# Patient Record
Sex: MALE | Race: WHITE
[De-identification: names, ages, dates, MRNs, and addresses within clinical notes are randomized per-mention and may not be internally consistent; named-entity substitution may affect disease eponyms.]

---

## 2020-05-26 ENCOUNTER — HOSPITAL ENCOUNTER (EMERGENCY)
Dept: HOSPITAL 75 - ER | Age: 40
Discharge: HOME | End: 2020-05-26
Payer: SELF-PAY

## 2020-05-26 VITALS — HEIGHT: 70.87 IN | BODY MASS INDEX: 25 KG/M2 | WEIGHT: 178.57 LBS

## 2020-05-26 VITALS — DIASTOLIC BLOOD PRESSURE: 88 MMHG | SYSTOLIC BLOOD PRESSURE: 122 MMHG

## 2020-05-26 VITALS — SYSTOLIC BLOOD PRESSURE: 128 MMHG | DIASTOLIC BLOOD PRESSURE: 93 MMHG

## 2020-05-26 DIAGNOSIS — F17.210: ICD-10-CM
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
## 2020-05-26 DIAGNOSIS — S01.81XA: ICD-10-CM

## 2020-05-26 DIAGNOSIS — S02.40DA: Primary | ICD-10-CM

## 2020-05-26 PROCEDURE — 70486 CT MAXILLOFACIAL W/O DYE: CPT

## 2020-05-26 PROCEDURE — 96372 THER/PROPH/DIAG INJ SC/IM: CPT

## 2020-05-26 NOTE — XMS REPORT
[facility]

                             Created on: 2018



Petar Kauffman

External Reference #: [id number]

: 1980

Sex: Male



Demographics





                          Address                   [address]  [address]

 

                          Preferred Language        Unknown

 

                          Marital Status            Unknown

 

                          Roman Catholic Affiliation     Unknown

 

                          Race                      Unknown

 

                          Ethnic Group              Unknown





Author





                          Author                    Petar ZIMMERMAN

 

                          Organization              [facility]

 

                          Address                   [address] [place]  [address]



 

                          Phone                     [phone]







Care Team Providers





                    Care Team Member Name Role                Phone

 

                    ANAID ZIMMERMAN      Unavailable         [phone]







PROBLEMS





          Type      Condition ICD9-CM Code CWF60-AR Code Onset Dates Condition S

tatus SNOMED 

Code

 

          Problem   Other chronic pain           G89.29              Active    8

1290491

 

          Problem   Hallux valgus (acquired), left foot           M20.12        

      Active    [id number]







ALLERGIES

No Known Allergies



ENCOUNTERS





                Encounter       Location        Date            Diagnosis

 

                          [facility] IN [facility]  [address] [facility] [id number]

[address] [place] [address] 

[address]                15 Sep, 2018              Bronchitis J40 ; Other chron

ic pain G89.29 and Pain in 

left knee M25.562

 

                          [facility]     [address] [facility] [id number]

[address] [place] [address]

                          16 Aug, 2018              Hallux valgus (acquired), le

ft foot M20.12







IMMUNIZATIONS

No Known Immunizations



SOCIAL HISTORY

Never Assessed



REASON FOR VISIT

Establish Care, Wants to get feet checked out and possibly get script for better
boots from work. 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
RN



PLAN OF CARE





VITAL SIGNS





                    Height              71 in               2018

 

                    Weight              204.4 lbs           2018

 

                    Temperature         97.9 degrees Fahrenheit 2018

 

                    Heart Rate          68 bpm              2018

 

                    Respiratory Rate    16                  2018

 

                    Oximetry            98 %                2018

 

                    BMI                 28.50 kg/m2         2018

 

                    Blood pressure systolic 114 mmHg            2018

 

                    Blood pressure diastolic 88 mmHg             2018







MEDICATIONS

No Known Medications



RESULTS

No Results



PROCEDURES

No Known procedures



INSTRUCTIONS





MEDICATIONS ADMINISTERED

No Known Medications



MEDICAL (GENERAL) HISTORY





                    Type                Description         Date

 

                    Medical History     HepC without treatment  

 

                    Surgical History    No know Surgical history

## 2020-05-26 NOTE — DIAGNOSTIC IMAGING REPORT
PROCEDURE: CT maxillofacial without contrast.



TECHNIQUE: Multiple contiguous axial images were obtained through

the facial bones without the use of intravenous contrast. Auto

Exposure Controls were utilized during the CT exam to meet ALARA

standards for radiation dose reduction. 



INDICATION: Left facial injury.



FINDINGS: The mandible appears intact. The zygomatic arches are

intact. Right-sided maxillary sinus walls are intact. There is a

fracture through the anterior wall of the left maxillary sinus

along the inferior and slightly outer aspect. This does not

appear to extend to the orbital floor. The medial and lateral

walls appear to be intact. There is trace fluid in the left

maxillary sinus. Nasal bones are intact. Medial and lateral

orbital walls appear to be intact. Both globes are unremarkable.

There is some soft tissue swelling in the left premaxillary

tissues with soft tissue laceration. There are some small

densities in the premaxillary tissues just anterior to the

inferior aspect of the left maxillary sinus. These may represent

small bone fragments versus foreign bodies. Frontal sinuses are

well aerated. Sphenoid is aerated as well as mastoid air cells.



IMPRESSION: Left anterior wall maxillary sinus fracture with very

slight depression. This does not appear to extend to the orbital

floor. There is left facial swelling and laceration with small

punctate opacities, as described above. No other facial fractures

are seen.



Dictated by: 



  Dictated on workstation # [id number]
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
## 2020-05-26 NOTE — XMS REPORT
[facility]

                             Created on: 10/04/2018



Petar Kauffman

External Reference #: [id number]

: 1980

Sex: Male



Demographics





                          Address                   [address]  [address]

 

                          Preferred Language        Unknown

 

                          Marital Status            Unknown

 

                          Spiritism Affiliation     Unknown

 

                          Race                      Unknown

 

                          Ethnic Group              Unknown





Author





                          Author                    Petar ZIMMERMAN

 

                          Organization              [facility]

 

                          Address                   [address] [place]  [address]



 

                          Phone                     [phone]







Care Team Providers





                    Care Team Member Name Role                Phone

 

                    ANAID ZIMMERMAN      Unavailable         [phone]







PROBLEMS





          Type      Condition ICD9-CM Code KNC73-UK Code Onset Dates Condition S

tatus SNOMED 

Code

 

          Problem   Other chronic pain           G89.29              Active    8

1827221

 

          Problem   Hallux valgus (acquired), left foot           M20.12        

      Active    [id number]







ALLERGIES

No Known Allergies



ENCOUNTERS





                Encounter       Location        Date            Diagnosis

 

                          [facility]  [address] [id number]

[address] [place] [address] 

[address]                03 Oct, 2018              Congestion of throat R68.89 

; Nausea and vomiting, 

intractability of vomiting not specified, unspecified vomiting type R11.2 and 
Diarrhea, unspecified type R19.7

 

                          [facility]  [address] 

[address]                15 Sep, 2018              Bronchitis J40 ; Other chron

ic pain G89.29 and Pain in 

left knee M25.562

 

                          [facility]     [address] [address]

                          16 Aug, 2018              Hallux valgus (acquired), le

ft foot M20.12







IMMUNIZATIONS

No Known Immunizations



SOCIAL HISTORY

Never Assessed



REASON FOR VISIT

cough, congestion, diarrhea, for 2 days. kbullardrn



PLAN OF CARE





VITAL SIGNS





                    Height              71 in               2018-09-15

 

                    Weight              211.4 lbs           2018-09-15

 

                    Temperature         98.2 degrees Fahrenheit 2018-09-15

 

                    Heart Rate          74 bpm              2018-09-15

 

                    Respiratory Rate    20                  2018-09-15

 

                    BMI                 29.48 kg/m2         2018-09-15

 

                    Blood pressure systolic 126 mmHg            2018-09-15

 

                    Blood pressure diastolic 78 mmHg             2018-09-15







MEDICATIONS





        Medication Instructions Dosage  Frequency Start Date End Date Duration S

tatus

 

             Diclofenac Sodium 75 MG Orally Twice a day 1 tablet with food or mi

lk           15 

Sep, 2018           14 Dec, 2018        30 day(s)           Active

 

           Doxycycline Hyclate 100 mg Orally Twice a day 1 capsule  12h        1

5 Sep, 2018 25 Sep,

2018                      10 day(s)                 Active

 

          PredniSONE 20 mg Orally Once a day 2 tablets 24h       15 Sep, 2018 20

 Sep, 2018 5 

days                                    Active







RESULTS

No Results



PROCEDURES

No Known procedures



INSTRUCTIONS





MEDICATIONS ADMINISTERED

No Known Medications



MEDICAL (GENERAL) HISTORY





                    Type                Description         Date

 

                    Medical History     HepC without treatment  

 

                    Surgical History    No know Surgical history

## 2020-05-26 NOTE — XMS REPORT
Continuity of Care Document

                             Created on: 2020



Petar Kauffman

External Reference #: [id number]

: 1980

Sex: Male



Demographics





                          Home Phone                [phone]

 

                          Preferred Language        Unknown

 

                          Marital Status            Unknown

 

                          Mormon Affiliation     Unknown

 

                          Race                      Unknown

 

                          Ethnic Group              Unknown





Author





                          Organization              Unknown

 

                          Address                   Unknown

 

                          Phone                     Unavailable



              



Allergies

      



             Active           Description           Code           Type         

  Severity   

                Reaction           Onset           Reported/Identified          

 

Relationship to Patient                 Clinical Status        

 

                Yes             No Known Drug Allergies           [id number]    

       Drug 

Allergy           Unknown           N/A                             2020  

      

                                                             



                  



Medications

      



There is no data.                  



Problems

      



There is no data.                  



Procedures

      



There is no data.                  



Results

      



                    Test                Result              Range        

 

                                        STOOL (C-DIFF) - 10/04/18 09:48         

 

                    CLOSTRIDIUM DIFFICILE TOXIN/GDH W/REFL TO PCR           SEE 

NOTE            NRG 

      

 

                                        STOOL (O T P) - 10/04/18 09:48         

 

                    OVA AND PARASITES, CONC AND PERM SMEAR           SEE NOTE   

         NRG        



                  



Encounters

      



                ACCT No.           Visit Date/Time           Discharge          

 Status         

             Pt. Type           Provider           Facility           Loc./Unit 

          

Complaint        

 

             [id number]           10/04/2018 10:40:00                              

       Document

Registration                                                                    

 

                    [id number]           2020 15:40:00           

020 17:31:00        

                DIS             Emergency           SORAYA JACKSON         

  [facility]           ER                        LEFT SIDE OF FACE LAC

## 2020-05-26 NOTE — XMS REPORT
[facility]

                             Created on: 10/12/2018



Petar Kauffman

External Reference #: [id number]

: 1980

Sex: Male



Demographics





                          Address                   [address]  [address]

 

                          Preferred Language        Unknown

 

                          Marital Status            Unknown

 

                          Temple Affiliation     Unknown

 

                          Race                      Unknown

 

                          Ethnic Group              Unknown





Author





                          Author                    Petar DOSHI

 

                          Organization              [facility]

 

                          Address                   [address] [place]  [address]



 

                          Phone                     Unavailable







Care Team Providers





                    Care Team Member Name Role                Phone

 

                    DIMITRI DOSHI    Unavailable         Unavailable







PROBLEMS





          Type      Condition ICD9-CM Code CLB25-GU Code Onset Dates Condition S

tatus SNOMED 

Code

 

          Problem   Other chronic pain           G89.29              Active    8

[id number]

 

          Problem   Hallux valgus (acquired), left foot           M20.12        

      Active    [id number]







ALLERGIES

No Information



ENCOUNTERS





                Encounter       Location        Date            Diagnosis

 

                          [place] [address] [address] [address]

                          09 Oct, 2018               

 

                          [address] [address]

                          04 Oct, 2018              Congestion of throat R68.89 

; Nausea and vomiting, intractability 

of vomiting not specified, unspecified vomiting type R11.2 and Diarrhea, 
unspecified type R19.7

 

                          [facility] WALK IN [address] 

[address]                03 Oct, 2018              Congestion of throat R68.89 

; Nausea and vomiting, 

intractability of vomiting not specified, unspecified vomiting type R11.2 and 
Diarrhea, unspecified type R19.7

 

                          [facility] WALK IN [address] 

[address]                15 Sep, 2018              Bronchitis J40 ; Other chron

ic pain G89.29 and Pain in 

left knee M25.562

 

                          [address] [address]

                          16 Aug, 2018              Hallux valgus (acquired), le

ft foot M20.12







IMMUNIZATIONS

No Known Immunizations



SOCIAL HISTORY

Never Assessed



REASON FOR VISIT

Lab (walk-in)



PLAN OF CARE





                          Activity                  Details

 

                                         

 

                          Pending Test              STOOL (O & P) 



                                        



VITAL SIGNS





MEDICATIONS

Unknown Medications



RESULTS

No Results



PROCEDURES





                Procedure       Date Ordered    Result          Body Site

 

                C DIFF AMPLIFIED PROBE Oct 04, 2018                     

 

                OVA AND PARASITES SMEARS Oct 04, 2018                     

 

                SMEAR, COMPLEX STAIN Oct 04, 2018                     







INSTRUCTIONS





MEDICATIONS ADMINISTERED

No Known Medications



MEDICAL (GENERAL) HISTORY





                    Type                Description         Date

 

                    Medical History     HepC without treatment  

 

                    Surgical History    No know Surgical history

## 2020-06-05 ENCOUNTER — HOSPITAL ENCOUNTER (EMERGENCY)
Dept: HOSPITAL 75 - ER | Age: 40
Discharge: HOME | End: 2020-06-05
Payer: SELF-PAY

## 2020-06-05 VITALS — DIASTOLIC BLOOD PRESSURE: 87 MMHG | SYSTOLIC BLOOD PRESSURE: 138 MMHG

## 2020-06-05 VITALS — BODY MASS INDEX: 23.09 KG/M2 | HEIGHT: 70.98 IN | WEIGHT: 164.91 LBS

## 2020-06-05 DIAGNOSIS — S01.81XD: Primary | ICD-10-CM
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
## 2020-06-05 NOTE — XMS REPORT
Continuity of Care Document

                             Created on: 2020



Petar Kauffman

External Reference #: [id number]

: 1980

Sex: Male



Demographics





                          Home Phone                [phone]

 

                          Preferred Language        Unknown

 

                          Marital Status            Unknown

 

                          Jainism Affiliation     Unknown

 

                          Race                      Unknown

 

                          Ethnic Group              Unknown





Author





                          Organization              Unknown

 

                          Address                   Unknown

 

                          Phone                     Unavailable



              



Allergies

      



             Active           Description           Code           Type         

  Severity   

                Reaction           Onset           Reported/Identified          

 

Relationship to Patient                 Clinical Status        

 

                Yes             No Known Drug Allergies           [id number]    

       Drug 

Allergy           Unknown           N/A                             2020  

      

                                                             



                  



Medications

      



There is no data.                  



Problems

      



             Date Dx Coded           Attending           Type           Code    

       

Diagnosis                               Diagnosed By        

 

                2020           SORAYA JACKSON           Ot           

   F17.210         

                          NICOTINE DEPENDENCE, CIGARETTES, UNCOMPL              

      

 

                2020           SORAYA JACKSON           Ot           

   S01.81XA        

                          LACERATION W/O FOREIGN BODY OF OTH PART               

      

 

                2020           SORAYA JACKSON           Ot           

   S02.40DA        

                          MAXILLARY FRACTURE, LEFT SIDE, INIT                   

 

 

                2020           SORAYA JACKSON           Ot           

   W38.XXXA        

                          EXPLOSION AND RUPTURE OF OTH PRESSURIZED              

      



                        



Procedures

      



There is no data.                  



Results

      



                    Test                Result              Range        

 

                                        STOOL (C-DIFF) - 10/04/18 09:48         

 

                    CLOSTRIDIUM DIFFICILE TOXIN/GDH W/REFL TO PCR           SEE 

NOTE            NRG 

      

 

                                        STOOL (O T P) - 10/04/18 09:48         

 

                    OVA AND PARASITES, CONC AND PERM SMEAR           SEE NOTE   

         NRG        



                  



Encounters

      



                ACCT No.           Visit Date/Time           Discharge          

 Status         

             Pt. Type           Provider           Facility           Loc./Unit 

          

Complaint        

 

             [id number]           10/04/2018 10:40:00                              

       Document

Registration                                                                    

 

                    [id number]           2020 15:40:00           

020 17:31:00        

                DIS             Outpatient           SORAYA JACKSON        

   [facility]           ER                        LEFT SIDE OF FACE LAC

## 2022-11-14 ENCOUNTER — HOSPITAL ENCOUNTER (EMERGENCY)
Dept: HOSPITAL 75 - ER | Age: 42
Discharge: HOME | End: 2022-11-14
Payer: SELF-PAY

## 2022-11-14 VITALS — BODY MASS INDEX: 23.8 KG/M2 | WEIGHT: 169.98 LBS | HEIGHT: 70.87 IN

## 2022-11-14 VITALS — SYSTOLIC BLOOD PRESSURE: 151 MMHG | DIASTOLIC BLOOD PRESSURE: 87 MMHG
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
## 2022-11-14 DIAGNOSIS — S61.012A: Primary | ICD-10-CM

## 2022-11-14 DIAGNOSIS — Z23: ICD-10-CM

## 2022-11-14 DIAGNOSIS — F17.290: ICD-10-CM

## 2022-11-14 PROCEDURE — 90715 TDAP VACCINE 7 YRS/> IM: CPT

## 2022-11-14 NOTE — ED INTEGUMENTARY GENERAL
General


Chief Complaint:  Skin/Wound Problems


Stated Complaint:  LT HAND THUMB LAC,BLEEDING


Nursing Triage Note:  


PT PRESENTS TO ED WITH COMPLAINTS OF L THUMB/HAND LAC WHEN SHARPENING HIS KNIFE 


APROX 2 HOURS AGO.


Source:  patient


Exam Limitations:  no limitations


 (JOSHUA GOOD)





History of Present Illness


Date Seen by Provider:  Nov 14, 2022


Time Seen by Provider:  16:40


Initial Comments


Patient is a 42 y/o M who presents to ER with CC of lateral left thumb 

laceration that occurred 2 hours ago. Patient reports he was sharpening his 

pocket knife when his hand slipped and nicked his left thumb. He has tried to 

stop the bleeding at home with a tshirt, adhesive spray bandage, and butterfly 

bandaids, but the bleeding continues. Pain is currently at 7/10. States he feels

some "tingling" extending up into his left wrist and forearm. He is not UTD on 

his tetanus. Denies any other complaints at this time.


Timing/Duration:  just prior to arrival


Location:  hands (left thumb)


 (JOSHUA GOOD)





Allergies and Home Medications


Allergies


Coded Allergies:  


     No Known Drug Allergies (Unverified , 5/26/20)





Patient Home Medication List


Home Medication List Reviewed:  Yes


 (JOSHUA GOOD)


Home Medication List Reviewed:  Yes


 (DEB KAUR MD)


Cephalexin (Keflex) 500 Mg Capsule, 500 MG PO QID


   Prescribed by: SORAYA JACKSON on 5/26/20 1713


Hydrocodone/Acetaminophen (Lorcet 5-325 mg Tablet) 1 Each Tablet, 1 EACH PO Q4-

6HR PRN for PAIN-MODERATE


   Prescribed by: SORAYA JACKSON on 5/26/20 1715





Review of Systems


Review of Systems


Constitutional:  no symptoms reported


EENTM:  no symptoms reported


Respiratory:  no symptoms reported


Cardiovascular:  no symptoms reported


Gastrointestinal:  no symptoms reported


Genitourinary:  no symptoms reported


Musculoskeletal:  no symptoms reported


Skin:  other (lateral left thumb laceration) (JOSHUA GOOD)





Past Medical-Social-Family Hx


Patient Social History


Tobacco Use?:  Yes


Smoking Status:  Current Everyday Smoker


Use of E-Cig and/or Vaping dev:  Yes


E-Cig or Vaping type used:  Nicotine, Synthetic Cannabinoids


Use of E-Cig and/or Vaping Jaden:  Light User


Substance use?:  Yes


Substance type:  Marijuana


Alcohol Use?:  No


Pt feels they are or have been:  No


 ([facility],JOSHUA)





Immunizations Up To Date


Tetanus Booster (TDap):  Less than 5yrs


First/Initial COVID19 Vaccinat:  YES


Second COVID19 Vaccination Meir:  YES


 ([facility]





Seasonal Allergies


Seasonal Allergies:  No


 ([facility]





Past Medical History


Surgery/Hospitalization HX:  


PMH: HEP C,


Surgeries:  No


Respiratory:  No


Cardiac:  No


Neurological:  No


Genitourinary:  No


Gastrointestinal:  No


Musculoskeletal:  No


Endocrine:  No


HEENT:  No


Cancer:  No


Psychosocial:  No


Integumentary:  No


Blood Disorders:  Yes (HEP C)


 ([facility]





Physical Exam


Vital Signs





Vital Signs - First Documented








 11/14/22





 16:24


 


Temp 36.7


 


Pulse 88


 


Resp 16


 


B/P (MAP) 151/87 (108)


 


Pulse Ox 99





 (DEB KAUR MD)


Vital Signs


Capillary Refill : Less Than 3 Seconds 


 (ALISONBARWALLY RANDALLA)


General Appearance:  WD/WN, no apparent distress


Extremities:  other (laceration to left lateral thumb, 2.5 cm, actively 

bleeding) (JOSHUA GOOD)


Neck:  normal inspection


Cardiovascular:  regular rate, rhythm


Respiratory:  no respiratory distress, no accessory muscle use


Extremities:  normal range of motion, non-tender


Neurologic/Psychiatric:  alert, normal mood/affect, oriented x 3


Skin:  other (2.5cm laceration to the dorso-lateral aspect of the left thumb; 

min active bleeding. distal NVI; no tendon injuries are visible) 

(DEB KAUR MD)





Procedures/Interventions





Other Wound Location


lateral aspect of the left thumb


   Wound Length (cm):  2.5


   Wound's Depth, Shape:  linear


   Wound Explored:  clean


   Anesthesia:  1% Lidocaine


   Volume Anesthetic (ccs):  4


   Suture:  Ethlion


   Suture Size:  3-0, 5-0


   Number of Sutures:  4


   Sterile Dressing Applied?:  Yes


 (WALLY GOODA)





   Wound Location:  Upper Extremities


Other Wound Location


left thumb


 (DEB KAUR MD)





Progress/Results/Core Measures


Results/Orders


My Orders





Orders - DEB KAUR MD


Dipht,Pertuss(Acell),Tet Adult (Boostrix (11/14/22 16:45)


Lidocaine 1% Inj 20 Ml (Xylocaine 1% Inj (11/14/22 16:45)


 (DEB KAUR MD)


Medications Given in ED


 (DBE KAUR MD)


Vital Signs/I&O











 11/14/22 11/14/22





 16:24 17:37


 


Temp 36.7 


 


Pulse 88 88


 


Resp 16 16


 


B/P (MAP) 151/87 (108) 151/87


 


Pulse Ox 99 99





 (DEB KAUR MD)








Blood Pressure Mean:                    108











Progress


Progress Note :  


   Time:  17:19


Progress Note


41-year-old male presents to the emergency department with left dorsal 2.5 cm 

laceration due to a pocket knife.  Neurovascularly intact.  No tendon injury is 

visualized.  Normal motor function of the thumb.  Wound scrubbed with Betasept 

and saline.  Closed with 4-0 nylon sutures.  Hemostasis achieved.  Stitches will

need to come out in 2 weeks.  Tetanus shot updated.  Educated on wound care.


 (DEB KAUR MD)





Departure


Impression





   Primary Impression:  


   Thumb laceration


   Qualified Codes:  S61.012A - Laceration without foreign body of left thumb 

   without damage to nail, initial encounter


Disposition:  01 HOME, SELF-CARE


Condition:  Stable





Departure-Patient Inst.


Decision time for Depature:  17:29


 (DEB KAUR MD)


Referrals:  


[facility],LOCAL PHYSICIAN (PCP)


Primary Care Physician


Patient Instructions:  Laceration Repair With Stitches ED





Add. Discharge Instructions:  


Keep the dressing in place for 24 hours.





Start washing gently with soap and water twice a day tomorrow.  Put a small 

layer of triple antibiotic ointment on the stitches for the next 2-3 days and 

keep them covered.





Do not submerge your hand under water in dishwater/lake water until the stitches

are out in 10-14 days.





Come back to the ER for stitch removal as it is a part of this visit.





Watch for signs of infection - increased pain, redness, swelling, pus or 

streaking up your hand.  If any of these start - please come back to the ER for 

re-evaluation.





You can take over the counter ibuprofen or tylenol as needed for pain.


Verification and Attestation of Medical Student E/M Service





A medical student performed and documented this service in my presence. I 

reviewed and verified all information documented by the medical student and made

modifications to such information, when appropriate. I personally performed the 

physical exam and medical decision making. 





 Deb Kaur, Nov 18, 2022,06:29


  


 (DEB KAUR MD)











JOSHUA GOOD               Nov 14, 2022 16:42


DEB KAUR MD         Nov 14, 2022 17:21